# Patient Record
Sex: MALE | Race: WHITE | Employment: OTHER | URBAN - METROPOLITAN AREA
[De-identification: names, ages, dates, MRNs, and addresses within clinical notes are randomized per-mention and may not be internally consistent; named-entity substitution may affect disease eponyms.]

---

## 2018-07-09 PROBLEM — G45.9 TIA (TRANSIENT ISCHEMIC ATTACK): Status: ACTIVE | Noted: 2018-07-09

## 2018-10-04 ENCOUNTER — HOSPITAL ENCOUNTER (OUTPATIENT)
Dept: NON INVASIVE DIAGNOSTICS | Age: 73
Discharge: HOME | End: 2018-10-04
Payer: MEDICARE

## 2018-10-04 LAB
LV EF: 58 %
LVEF MODALITY: NORMAL

## 2018-10-04 PROCEDURE — 93306 TTE W/DOPPLER COMPLETE: CPT

## 2022-04-01 ENCOUNTER — OFFICE VISIT (OUTPATIENT)
Dept: ORTHOPEDIC SURGERY | Age: 77
End: 2022-04-01
Payer: MEDICARE

## 2022-04-01 VITALS — BODY MASS INDEX: 31.22 KG/M2 | HEIGHT: 68 IN | WEIGHT: 206 LBS

## 2022-04-01 DIAGNOSIS — M23.203 DEGENERATIVE TEAR OF MEDIAL MENISCUS OF RIGHT KNEE: ICD-10-CM

## 2022-04-01 DIAGNOSIS — M17.11 PRIMARY OSTEOARTHRITIS OF RIGHT KNEE: Primary | ICD-10-CM

## 2022-04-01 PROCEDURE — 99203 OFFICE O/P NEW LOW 30 MIN: CPT | Performed by: ORTHOPAEDIC SURGERY

## 2022-04-01 PROCEDURE — 4040F PNEUMOC VAC/ADMIN/RCVD: CPT | Performed by: ORTHOPAEDIC SURGERY

## 2022-04-01 PROCEDURE — 4004F PT TOBACCO SCREEN RCVD TLK: CPT | Performed by: ORTHOPAEDIC SURGERY

## 2022-04-01 PROCEDURE — 1123F ACP DISCUSS/DSCN MKR DOCD: CPT | Performed by: ORTHOPAEDIC SURGERY

## 2022-04-01 PROCEDURE — 20610 DRAIN/INJ JOINT/BURSA W/O US: CPT | Performed by: ORTHOPAEDIC SURGERY

## 2022-04-01 PROCEDURE — G8419 CALC BMI OUT NRM PARAM NOF/U: HCPCS | Performed by: ORTHOPAEDIC SURGERY

## 2022-04-01 PROCEDURE — G8427 DOCREV CUR MEDS BY ELIG CLIN: HCPCS | Performed by: ORTHOPAEDIC SURGERY

## 2022-04-01 NOTE — PROGRESS NOTES
KNEE VISIT      HISTORY OF PRESENT ILLNESS    Rajesh Russo is a 67 y.o. male who presents for evaluation of right knee pain is had intermittently for the last several months. He says it started when he was walking up and down steps to get a question of preparations in his attic. Now he has intermittent stiffness and burning when he stands for too long or sits down for short or brief period of time gets back up is okay for another. Of time. At this time he says he has no pain now but says maybe by the time he walks out the car it would hurt more. He says last night was difficult sleeping because of discomfort. He denies any specific history of trauma grades the pain at worst 6/10. ROS    Well-documented patient she form dated for 122  All other ROS negative except for above. Past Surgical history    Past Surgical History:   Procedure Laterality Date    COLONOSCOPY      HERNIA REPAIR Bilateral 5/27/14    Bilateral Inguinal Hernia Repair with Mesh    OTHER SURGICAL HISTORY  05-    cysto, prostate biopsy       PAST MEDICAL    Past Medical History:   Diagnosis Date    Hypertension        Allergies    Allergies   Allergen Reactions    Oxycodone Nausea And Vomiting       Meds    Current Outpatient Medications   Medication Sig Dispense Refill    clopidogrel (PLAVIX) 75 MG tablet Take 1 tablet by mouth daily 30 tablet 3    clopidogrel (PLAVIX) 75 MG tablet Take 1 tablet by mouth daily 30 tablet 3    aspirin 81 MG tablet Take 81 mg by mouth daily. No current facility-administered medications for this visit.        Social    Social History     Socioeconomic History    Marital status:      Spouse name: Not on file    Number of children: Not on file    Years of education: Not on file    Highest education level: Not on file   Occupational History    Not on file   Tobacco Use    Smoking status: Former Smoker     Years: 30.00     Quit date: 1/1/1995     Years since quitting: 27.2    Smokeless tobacco: Not on file   Substance and Sexual Activity    Alcohol use: Yes     Comment: occ    Drug use: No    Sexual activity: Not on file   Other Topics Concern    Not on file   Social History Narrative    Not on file     Social Determinants of Health     Financial Resource Strain:     Difficulty of Paying Living Expenses: Not on file   Food Insecurity:     Worried About Running Out of Food in the Last Year: Not on file    Danny of Food in the Last Year: Not on file   Transportation Needs:     Lack of Transportation (Medical): Not on file    Lack of Transportation (Non-Medical): Not on file   Physical Activity:     Days of Exercise per Week: Not on file    Minutes of Exercise per Session: Not on file   Stress:     Feeling of Stress : Not on file   Social Connections:     Frequency of Communication with Friends and Family: Not on file    Frequency of Social Gatherings with Friends and Family: Not on file    Attends [employer] Services: Not on file    Active Member of [address] [place] [address] [employer] or Organizations: Not on file    Attends Club or Organization Meetings: Not on file    Marital Status: Not on file   Intimate Partner Violence:     Fear of Current or Ex-Partner: Not on file    Emotionally Abused: Not on file    Physically Abused: Not on file    Sexually Abused: Not on file   Housing Stability:     Unable to Pay for Housing in the Last Year: Not on file    Number of [place] in the Last Year: Not on file    Unstable Housing in the Last Year: Not on file       Family HISTORY    History reviewed. No pertinent family history. PHYSICAL EXAM    Vital Signs:  Ht 5' 8\" (1.727 m)   Wt 206 lb (93.4 kg)   BMI 31.32 kg/m²   General Appearance:  Normal body habitus. Alert and oriented to person, place, and time. Affect:  Normal.   Gait:  Normal. Good balance and coordination. Skin:  Intact. Sensation:  Intact. Strength:  Intact. Reflexes:  Intact. Pulses:  Intact.    Knee Exam: Effusion: Negative    Range of Motion Right Left   Extension 0 0   Flexion 115 115     Provocative Test Right Left    Positive Negative Positive Negative   Anterior drawer [] [] [] []   Lachman [] [] [] []   Posterior drawer [] [] [] []   Varus testing [] [x] [] [x]   Valgus testing [] [x] [] [x]   Joint line tenderness [x] [] [] [x]     Additional Exam Comments: His neurocirculatory lymphatic exam otherwise is normal symmetric to both lower extremities. He has minimal discomfort medially in the knee but no effusion or instability and no crepitus. He does have some palpable spurs along the medial femoral condyle to deep palpation. They are mild to moderate. IMAGING STUDIES    X-rays 4 views of his right knee taken at [facility] demonstrate grade 1-2 osteoarthritis with much greater than 50% of joint space remaining in no acute changes    I reviewed the MRI of his right knee that have been ordered prior to this visit and demonstrated a complex tear of the posterior horn of the medial meniscus but nothing else acute and no evidence of any subchondral edema. IMPRESSION    Right knee pain intermittent, secondary to osteoarthritis with a relatively asymptomatic medial meniscus tear    PLAN      1. Conservative care options including physical therapy, NSAIDs, bracing, and activity modification were discussed. 2.  The indications for therapeutic injections were discussed. 3.  The indications for additional imaging studies were discussed. 4.  After considering the various options discussed, the patient elected to pursue a course that includes cortisone injection right knee and do that periodically. He is on Plavix so he should not take anti-inflammatory medication.   I told him that if things persist or change he may benefit from arthroscopic intervention but I believe that the pain he is complaining of today which appears to be around the patella and superior is more related to stiffness due to osteoarthritis and is due to a degenerative medial meniscus tear. He appears comfortable with this recommendation will proceed accordingly. Recommendation is for a cortisone injection into the right knee. After informed consent was received from the patient, the right knee was injected with 1 mL(40mg)Kenalog and 4 mL of 0.25% Marcaine  in the syringe from an anterolateral joint line approach, using a 25-gauge needle, under sterile Betadine prep, using ethyl chloride as a topical refrigerant, for a diagnosis of osteoarthritis. The patient appeared to tolerate it well. The patient should return here periodically as needed. Encounter Diagnoses   Name Primary?  Primary osteoarthritis of right knee Yes    Degenerative tear of medial meniscus of right knee         No orders of the defined types were placed in this encounter.

## 2022-04-01 NOTE — PROGRESS NOTES
Site:right knee    Bupivacaine  Lot number: [id number]  NDC#  0468-5130-14    Depo  NDC# 3268-9420-46  Lot number: [id number]    Product:  Bupivacaine/Depo    All medications were supplied by the facility

## 2022-10-20 ENCOUNTER — OFFICE VISIT (OUTPATIENT)
Dept: ORTHOPEDIC SURGERY | Age: 77
End: 2022-10-20
Payer: MEDICARE

## 2022-10-20 VITALS — WEIGHT: 206 LBS | HEIGHT: 68 IN | BODY MASS INDEX: 31.22 KG/M2

## 2022-10-20 DIAGNOSIS — M17.11 PRIMARY OSTEOARTHRITIS OF RIGHT KNEE: Primary | ICD-10-CM

## 2022-10-20 PROCEDURE — 20610 DRAIN/INJ JOINT/BURSA W/O US: CPT | Performed by: ORTHOPAEDIC SURGERY

## 2022-10-20 RX ORDER — BUPIVACAINE HYDROCHLORIDE 2.5 MG/ML
7 INJECTION, SOLUTION INFILTRATION; PERINEURAL ONCE
Status: COMPLETED | OUTPATIENT
Start: 2022-10-20 | End: 2022-10-20

## 2022-10-20 RX ORDER — TRIAMCINOLONE ACETONIDE 40 MG/ML
40 INJECTION, SUSPENSION INTRA-ARTICULAR; INTRAMUSCULAR ONCE
Status: COMPLETED | OUTPATIENT
Start: 2022-10-20 | End: 2022-10-20

## 2022-10-20 RX ADMIN — BUPIVACAINE HYDROCHLORIDE 17.5 MG: 2.5 INJECTION, SOLUTION INFILTRATION; PERINEURAL at 11:35

## 2022-10-20 RX ADMIN — TRIAMCINOLONE ACETONIDE 40 MG: 40 INJECTION, SUSPENSION INTRA-ARTICULAR; INTRAMUSCULAR at 11:35

## 2022-10-20 NOTE — PROGRESS NOTES
Returns today for reevaluation of his right knee. He was given an injection into his knee back and April that worked really well up until last week. He says he was moving some furniture up and down steps and aggravated it and now is having some buckling and is fearful for falling. Headaches like a tooth ache most the time posterior medially at this time. He has difficulty sleeping due to pain and ache. On examination today he has good range of motion to his knee no effusion some pain posterior medially to direct palpation valgus stress and now some with Enrique's maneuver consistent with the MRI that demonstrated a medial meniscus tear. Impression: Right knee pain secondary to degenerative medial meniscus tear    Recommendation: At this time I had a long discussion with him about treatment options. I told him if he got 6 months of relief with last injection that I would recommend he continue doing those until such time as he has diminishing returns as far as getting symptoms sooner and sooner after injections. He appears comfortable without an knows that he may need to or benefit from arthroscopic intervention in the future but would like to hold off as long as possible since his wife has Parkinson's and needs to have him is her primary caregiver. Recommendation is for a cortisone injection into the right knee. After informed consent was received from the patient, the right knee was injected with 1 mL of 40mg/ml of Kenalog  and 4 mL of 0.25% Marcaine  in the syringe from an anterolateral joint line approach, using a 25-gauge needle, under sterile Betadine prep, using ethyl chloride as a topical refrigerant, for a diagnosis of osteoarthritis. The patient appeared to tolerate it well. The patient should return here periodically as needed. Encounter Diagnosis   Name Primary? Primary osteoarthritis of right knee Yes        No orders of the defined types were placed in this encounter.